# Patient Record
Sex: MALE | Race: WHITE | NOT HISPANIC OR LATINO | Employment: OTHER | URBAN - METROPOLITAN AREA
[De-identification: names, ages, dates, MRNs, and addresses within clinical notes are randomized per-mention and may not be internally consistent; named-entity substitution may affect disease eponyms.]

---

## 2017-01-01 ENCOUNTER — DOCUMENTATION ONLY (OUTPATIENT)
Dept: OTHER | Facility: CLINIC | Age: 77
End: 2017-01-01

## 2017-01-01 LAB
ANION GAP SERPL CALCULATED.3IONS-SCNC: 10 MMOL/L (ref 3–14)
BUN SERPL-MCNC: 29 MG/DL (ref 7–30)
CALCIUM SERPL-MCNC: 8.6 MG/DL (ref 8.5–10.1)
CHLORIDE SERPL-SCNC: 106 MMOL/L (ref 94–109)
CO2 SERPL-SCNC: 23 MMOL/L (ref 20–32)
CREAT SERPL-MCNC: 1.63 MG/DL (ref 0.66–1.25)
GFR SERPL CREATININE-BSD FRML MDRD: 41 ML/MIN/1.7M2
GLUCOSE SERPL-MCNC: 308 MG/DL (ref 70–99)
POTASSIUM SERPL-SCNC: 4.1 MMOL/L (ref 3.4–5.3)
SODIUM SERPL-SCNC: 139 MMOL/L (ref 133–144)

## 2017-01-01 PROCEDURE — 80048 BASIC METABOLIC PNL TOTAL CA: CPT | Performed by: FAMILY MEDICINE

## 2017-12-20 PROBLEM — Z71.89 ACP (ADVANCE CARE PLANNING): Chronic | Status: ACTIVE | Noted: 2017-01-01

## 2018-01-01 ENCOUNTER — HOSPITAL ENCOUNTER (EMERGENCY)
Facility: CLINIC | Age: 78
End: 2018-01-01
Payer: COMMERCIAL

## 2018-01-01 ENCOUNTER — OFFICE VISIT - HEALTHEAST (OUTPATIENT)
Dept: AUDIOLOGY | Facility: CLINIC | Age: 78
End: 2018-01-01

## 2018-01-01 ENCOUNTER — COMMUNICATION - HEALTHEAST (OUTPATIENT)
Dept: ADMINISTRATIVE | Facility: CLINIC | Age: 78
End: 2018-01-01

## 2018-01-01 ENCOUNTER — OFFICE VISIT - HEALTHEAST (OUTPATIENT)
Dept: OTOLARYNGOLOGY | Facility: CLINIC | Age: 78
End: 2018-01-01

## 2018-01-01 DIAGNOSIS — H90.6 MIXED HEARING LOSS, BILATERAL: ICD-10-CM

## 2018-01-01 DIAGNOSIS — H90.3 SENSORINEURAL HEARING LOSS (SNHL) OF BOTH EARS: ICD-10-CM

## 2018-01-01 RX ORDER — FUROSEMIDE 40 MG
40 TABLET ORAL
Status: SHIPPED | COMMUNITY
Start: 2017-01-19

## 2018-01-01 RX ORDER — AZELASTINE 1 MG/ML
1 SPRAY, METERED NASAL
Status: SHIPPED | COMMUNITY
Start: 2017-01-01

## 2018-01-01 RX ORDER — RAMIPRIL 5 MG/1
5 CAPSULE ORAL
Status: SHIPPED | COMMUNITY
Start: 2017-01-01

## 2018-01-01 RX ORDER — FINASTERIDE 5 MG/1
TABLET, FILM COATED ORAL
Status: SHIPPED | COMMUNITY
Start: 2017-01-01

## 2018-01-01 RX ORDER — METOPROLOL TARTRATE 25 MG/1
25 TABLET, FILM COATED ORAL
Status: SHIPPED | COMMUNITY
Start: 2017-01-01

## 2018-01-01 RX ORDER — DILTIAZEM HYDROCHLORIDE 180 MG/1
180 CAPSULE, COATED, EXTENDED RELEASE ORAL
Status: SHIPPED | COMMUNITY
Start: 2017-01-01

## 2018-01-01 RX ORDER — PREDNISONE 10 MG/1
TABLET ORAL
Status: SHIPPED | COMMUNITY
Start: 2017-01-01

## 2018-01-01 RX ORDER — ALBUTEROL SULFATE 0.83 MG/ML
2.5 SOLUTION RESPIRATORY (INHALATION)
Status: SHIPPED | COMMUNITY
Start: 2017-01-01

## 2018-01-01 RX ORDER — GABAPENTIN 300 MG/1
CAPSULE ORAL
Status: SHIPPED | COMMUNITY
Start: 2017-01-01

## 2018-01-01 RX ORDER — GLIPIZIDE 5 MG/1
TABLET ORAL
Status: SHIPPED | COMMUNITY
Start: 2017-01-01

## 2018-01-01 RX ORDER — CALCIPOTRIENE 50 UG/G
CREAM TOPICAL
Status: SHIPPED | COMMUNITY
Start: 2018-01-01

## 2018-01-01 RX ORDER — ALBUTEROL SULFATE 90 UG/1
AEROSOL, METERED RESPIRATORY (INHALATION)
Status: SHIPPED | COMMUNITY
Start: 2017-01-01

## 2021-05-26 ENCOUNTER — RECORDS - HEALTHEAST (OUTPATIENT)
Dept: ADMINISTRATIVE | Facility: CLINIC | Age: 81
End: 2021-05-26

## 2021-06-15 NOTE — PROGRESS NOTES
Hearing Aid Fitting    The patient was seen today for a hearing aid fitting. He was fit with a right Phonak Irish V70-SP hearing aid. The hearing aid is fit with Usman's old earmold. The earmold tubing is very brittle so it is replaced.  Real ear measurements revealed that the hearing aid was falling below DSL adult gain targets at all frequencies. Usman reported that the hearing aid volume seemed soft. The hearing aid was then reprogrammed using simulated real-ear measures in the test box. The hearing aid was able to meet DSL adult gain targets up to 2000 Hz. Frequency lowering is enabled. The feedback test is run on the hearing aid. Overall gain is increased to the maximum volume. Usman reports he can hear better with the hearing aid at this setting, but speech still isn't clear. Usman's older left hearing aid is inserted into his ear and the volume is adjusted to a comfortable level. He reports that he is hearing better when he uses both hearing aids.    Hearing aid:  : [employer]  Devices:  Irish V70-SP  Style:  BTE  Serial numbers:  R: [id number]  Batteries:  size 13  Warranty expiration:  4/28/2021    Otoscopy reveals significant cerumen in the right ear.  The patient reports satisfaction with the fit and sound quality of the instrument.  Use, care, trial period and realistic expectations were reviewed in detail.  Push button is set disabled. Volume control is enabled and Usman practices using it.  He has some difficulty inserting the hearing aid into his ear. His son is shown how to do this and he is able to do this successfully. Usman is counseled that he may hear and understand better if he is fit with 2 new hearing aids. He is told that he should wear hearing aids in both ears during all waking hours and use good communication strategies. He is given written instruction on use, care, and warranty.  He sets up a follow up appointment in 3 weeks.    The patient verbalized understanding and is in  agreement with this plan.    Mikey Monique, CCC-A  [place] Licensed Audiologist #[id number]

## 2021-06-15 NOTE — PROGRESS NOTES
"Cochlear Implant Evaluation  Referring Physician: Dr. Prakash (ENT)      History:   Usman is seen today for a cochlear implant evaluation. He is accompanied by his son at the visit today. Usman ambulates by a wheelchair and he is using portable oxygen during the visit today. He has a very difficult time hearing during the visit today so things are typed on the computer. He was seen by Dr. Prakash on 1/19/18 and it was recommended that he come in for a cochlear implant evaluation. Usman reports he has had hearing loss since childhood and he began using hearing aids in 1968. He states he currently has a pair of BTE hearing aids from \"[employer].\" He thinks that one of the hearing aids is 2 years old and the other is 7 years old. Usman reports he stopped using the hearing aids one month ago because he cannot hear when using them. He states that his hearing has become significantly worse over the past 2 months. His son reports that Usman has a very difficult time hearing on the telephone and he has to write things down in order to communicate with his father. He was seen for a cochlear implant evaluation at [facility] in 2015 and it was determined that he was not a candidate at that time. Usman reports he sometimes experiences lightheadedness. He states he had ear surgery in the past, but he is unsure what surgery was performed. He denies a history of otalgia, otorrhea, tinnitus, vertigo, and aural fullness.      Cochlear Implant Evaluation:      Hearing Evaluation: Otoscopy revealed significant cerumen in the left ear and nearly occluding cerumen in the right ear. Tympanometry could not be tested today because a seal could not be maintained. Pure tone audiometric testing revealed profound hearing loss, bilaterally. Bone conduction results were in the moderate to severe hearing range and masking could not be performed due to severity of hearing loss, therefore, mixed hearing loss could not be ruled out. " Word recognition was poor (36% right, 12% left), bilaterally to recorded Nu-6 word lists.     Hearing Aid:  Usman did not bring his personal hearing aids and earmolds with today. He was fit with a pair of loaner Phonak Irish V90-SP hearing aids and comply tips today. The hearing aids were able to meet [facility] adult targets from 250-2000 Hz when verified using simulated real-ear measures in the test box.      Minimal Speech Test Battery (MSTB):  Aided test results completed in the sound field at a calibrated 60 dBHL are as follows:      Candidacy Evaluation (1/25/2018)   [employer] (Quiet)  55% (binaural hearing aids)  65% RE only   21% LE only    [employer] (+10 SNR) Binaural 38% (binaural hearing aids)  34% RE only   [employer]NC (Quiet) Binaural 52% words; 73% phonemes   BKB-SIN 10.5 dB SNR       Results and Recommendations:  Results today indicate that Usman is a candidate for a cochlear implant. Medicare determines cochlear implant candidacy based on speech perception scores performed in the best aided condition. A performance of 40% or less correct does indicate candidacy, and the patient is within that criteria for today s testing. Realistic expectations are discussed in detail, as well as factors that influence performance with cochlear implants. Usman decides that he is not ready to pursue a cochlear implant at this time. He prefers to pursue a new hearing aid. The results of the testing today will be shared with Dr. Prakash.     Hearing Aid Evaluation:   Hearing aid options were discussed with Usman. He would like to pursue a Phonak Irish V70-SP hearing aid for his right ear. The hearing aid will be ordered in color P1. The device will be fit using his current earmold. Usman is provided the [place] [employer] brochure along with a copy of the purchase agreement. He is scheduled to return for a hearing aid fitting.      A total of 140 minutes is spent with the patient today.     Mikey Monique, CCC-FERNANDA  [place]  Licensed Audiologist #[id number]

## 2021-06-15 NOTE — PROGRESS NOTES
Usman Montes is a 77 y.o. male seen in consultation at the request of Dr. Angela for hearing loss. I have few records to go on today, and the patient cannot hear me at all to communicate.  I communicated by typing on the screen and he was able to communicate orally.  He mentions hearing loss most of his life, hearing aids since the late 1960's.  Over the last 5 months he has noticed quite significant hearing loss.  He denies otologic infections, surgeries, tinnitus or vertigo.  He denies noise exposure and family hearing loss.  Review of some of his [facility] records indicate COPD, uncontrolled DM2, and morbid obesity.    ALLERGY:  No Known Allergies    MEDICATIONS:     No current outpatient prescriptions on file prior to visit.     No current facility-administered medications on file prior to visit.        Past Medical/Surgical History, Family History and Social History reviewed in detail and documented separately in the medical record.    Complete Review of Systems:  A 10-point review was performed.  Pertinent positives are noted in the HPI and on a separate scanned document in the chart.    EXAM:  There were no vitals filed for this visit.    Nurse documentation reviewed  and documented separately.    General Appearance: Pleasant, alert, appropriate appearance for age. No acute distress    Head Exam: Normal. Normocephalic, atraumatic.    Eye Exam: Normal external eye, conjunctiva, lids, cornea. Extra-ocular movements are intact.    Left external ear: normal  Left otoscopic exam: Normal EAC. Normal TM     Right external ear: normal  Right otoscopic exam: Normal EAC. Normal TM    Nose Exam: Normal external nose. Septum midline. Nasal mucosa normal.  Inferior turbinates normal.    OroPharynx Exam: Dental hygiene adequate. Normal tongue. Normal buccal mucosa. Normal palate.  Normal pharynx. Normal tonsils.    Neck Exam: Supple, no masses or nodes. Trachea and larynx midline.    Thyroid Exam: No tenderness,  nodules or enlargement.    Salivary Glands: nontender without masses    Neuro: Alert and oriented times 3, CN 2-12 grossly intact, no nystagmus, PERRL, EOMI, normal speech and gait    Chest/Respiratory Exam: Normal chest wall motion and respiratory effort. No audible stridor or wheezing.    Cardiovascular Exam: Regular rate and rhythm.  No cyanosis, clubbing or edema.    Pulses: carotid pulses normal    ASSESSMENT:  1. Sensorineural hearing loss (SNHL) of both ears        PLAN: Findings, assessment, and management options were discussed. He reports his last hearing test is two years ago.  We need CI eval with standard audiogram and likely CT head to indicate wether he may or may not be a CI candidate.  I worry about his other underlying health to tolerate a general anesthesia.  Will see him back for review of results.